# Patient Record
Sex: MALE | Race: WHITE | URBAN - METROPOLITAN AREA
[De-identification: names, ages, dates, MRNs, and addresses within clinical notes are randomized per-mention and may not be internally consistent; named-entity substitution may affect disease eponyms.]

---

## 2017-07-27 ENCOUNTER — OFFICE VISIT (OUTPATIENT)
Dept: FAMILY MEDICINE CLINIC | Age: 47
End: 2017-07-27

## 2017-07-27 VITALS
HEART RATE: 82 BPM | RESPIRATION RATE: 20 BRPM | WEIGHT: 253 LBS | DIASTOLIC BLOOD PRESSURE: 80 MMHG | SYSTOLIC BLOOD PRESSURE: 120 MMHG | OXYGEN SATURATION: 96 % | HEIGHT: 69 IN | BODY MASS INDEX: 37.47 KG/M2 | TEMPERATURE: 98.3 F

## 2017-07-27 DIAGNOSIS — J06.9 UPPER RESPIRATORY TRACT INFECTION, UNSPECIFIED TYPE: Primary | ICD-10-CM

## 2017-07-27 RX ORDER — GUAIFENESIN 600 MG/1
600 TABLET, EXTENDED RELEASE ORAL 2 TIMES DAILY
COMMUNITY
End: 2020-02-03 | Stop reason: ALTCHOICE

## 2017-07-27 RX ORDER — AMOXICILLIN AND CLAVULANATE POTASSIUM 500; 125 MG/1; MG/1
1 TABLET, FILM COATED ORAL 2 TIMES DAILY
Qty: 20 TAB | Refills: 0 | Status: SHIPPED | OUTPATIENT
Start: 2017-07-27 | End: 2017-08-06

## 2017-07-27 RX ORDER — HYDROCODONE POLISTIREX AND CHLORPHENIRAMINE POLISTIREX 10; 8 MG/5ML; MG/5ML
5 SUSPENSION, EXTENDED RELEASE ORAL
Qty: 75 ML | Refills: 0 | Status: SHIPPED | OUTPATIENT
Start: 2017-07-27 | End: 2020-02-03 | Stop reason: ALTCHOICE

## 2017-07-27 NOTE — MR AVS SNAPSHOT
Visit Information Date & Time Provider Department Dept. Phone Encounter #  
 7/27/2017 11:45 AM [facility] [phone] [id number] Upcoming Health Maintenance Date Due DTaP/Tdap/Td series (1 - Tdap) 8/6/1992 INFLUENZA AGE 9 TO ADULT 8/1/2017 Allergies as of 7/27/2017  Review Complete On: 7/27/2017 By: Obed Gates No Known Allergies Current Immunizations  Never Reviewed Name Date Influenza Vaccine 1/15/2013 Not reviewed this visit You Were Diagnosed With   
  
 Codes Comments Upper respiratory tract infection, unspecified type    -  Primary ICD-10-CM: J06.9 ICD-9-CM: 465.9 Vitals BP Pulse Temp Resp Height(growth percentile) Weight(growth percentile) 120/80 (BP 1 Location: Left arm, BP Patient Position: Sitting) 82 98.3 °F (36.8 °C) (Oral) 20 5' 9\" (1.753 m) 253 lb (114.8 kg) SpO2 BMI Smoking Status 96% 37.36 kg/m2 Never Smoker Vitals History BMI and BSA Data Body Mass Index Body Surface Area  
 37.36 kg/m 2 2.36 m 2 Preferred Pharmacy Pharmacy Name Phone [facility] [address] [place] [facility] [phone] Your Updated Medication List  
  
   
This list is accurate as of: 7/27/17 12:19 PM.  Always use your most recent med list.  
  
  
  
  
 amoxicillin-clavulanate 500-125 mg per tablet Commonly known as:  AUGMENTIN Take 1 Tab by mouth two (2) times a day for 10 days. chlorpheniramine-HYDROcodone 10-8 mg/5 mL suspension Commonly known as:  Clarnce Flurry Take 5 mL by mouth nightly as needed for Cough. Max Daily Amount: 5 mL.  
  
 guaiFENesin-dextromethorphan -30 mg per tablet Commonly known as:  HUMIBID DM Take 1 Tab by mouth two (2) times a day. HYDROcodone-homatropine 5-1.5 mg/5 mL (5 mL) syrup Commonly known as:  HYCODAN Take 5 mL by mouth four (4) times daily as needed for Cough.  Max Daily Amount: 20 mL. MUCINEX 600 mg ER tablet Generic drug:  guaiFENesin ER Take 600 mg by mouth two (2) times a day. Prescriptions Printed Refills  
 chlorpheniramine-HYDROcodone (TUSSIONEX) 10-8 mg/5 mL suspension 0 Sig: Take 5 mL by mouth nightly as needed for Cough. Max Daily Amount: 5 mL. Class: Print Route: Oral  
  
Prescriptions Sent to Pharmacy Refills  
 amoxicillin-clavulanate (AUGMENTIN) 500-125 mg per tablet 0 Sig: Take 1 Tab by mouth two (2) times a day for 10 days. Class: Normal  
 Pharmacy: [facility] [address] [place] [facility] #: [phone] Route: Oral  
  
Patient Instructions Upper Respiratory Infection (Cold): Care Instructions Your Care Instructions An upper respiratory infection, or URI, is an infection of the nose, sinuses, or throat. URIs are spread by coughs, sneezes, and direct contact. The common cold is the most frequent kind of URI. The flu and sinus infections are other kinds of URIs. Almost all URIs are caused by viruses. Antibiotics won't cure them. But you can treat most infections with home care. This may include drinking lots of fluids and taking over-the-counter pain medicine. You will probably feel better in 4 to 10 days. The doctor has checked you carefully, but problems can develop later. If you notice any problems or new symptoms, get medical treatment right away. Follow-up care is a key part of your treatment and safety. Be sure to make and go to all appointments, and call your doctor if you are having problems. It's also a good idea to know your test results and keep a list of the medicines you take. How can you care for yourself at home? · To prevent dehydration, drink plenty of fluids, enough so that your urine is light yellow or clear like water. Choose water and other caffeine-free clear liquids until you feel better.  If you have kidney, heart, or liver disease and have to limit fluids, talk with your doctor before you increase the amount of fluids you drink. · Take an over-the-counter pain medicine, such as acetaminophen (Tylenol), ibuprofen (Advil, Motrin), or naproxen (Aleve). Read and follow all instructions on the label. · Before you use cough and cold medicines, check the label. These medicines may not be safe for young children or for people with certain health problems. · Be careful when taking over-the-counter cold or flu medicines and Tylenol at the same time. Many of these medicines have acetaminophen, which is Tylenol. Read the labels to make sure that you are not taking more than the recommended dose. Too much acetaminophen (Tylenol) can be harmful. · Get plenty of rest. 
· Do not smoke or allow others to smoke around you. If you need help quitting, talk to your doctor about stop-smoking programs and medicines. These can increase your chances of quitting for good. When should you call for help? Call 911 anytime you think you may need emergency care. For example, call if: 
· You have severe trouble breathing. Call your doctor now or seek immediate medical care if: 
· You seem to be getting much sicker. · You have new or worse trouble breathing. · You have a new or higher fever. · You have a new rash. Watch closely for changes in your health, and be sure to contact your doctor if: 
· You have a new symptom, such as a sore throat, an earache, or sinus pain. · You cough more deeply or more often, especially if you notice more mucus or a change in the color of your mucus. · You do not get better as expected. Where can you learn more? Go to http://haim-hilda.info/. Enter P460 in the search box to learn more about \"Upper Respiratory Infection (Cold): Care Instructions. \" Current as of: March 25, 2017 Content Version: 11.3 © 1930-9834 [employer].  Care instructions adapted under license by [address] (which disclaims liability or warranty for this information). If you have questions about a medical condition or this instruction, always ask your healthcare professional. [address] any warranty or liability for your use of this information. Introducing [facility]! [facility] introduces JAZIO patient portal. Now you can access parts of your medical record, email your doctor's office, and request medication refills online. 1. In your internet browser, go to [email or web address] 2. Click on the First Time User? Click Here link in the Sign In box. You will see the New Member Sign Up page. 3. Enter your JAZIO Access Code exactly as it appears below. You will not need to use this code after youve completed the sign-up process. If you do not sign up before the expiration date, you must request a new code. · JAZIO Access Code: [email or web address] Expires: 10/25/2017 12:19 PM 
 
4. Enter the last four digits of your Social Security Number (xxxx) and Date of Birth (mm/dd/yyyy) as indicated and click Submit. You will be taken to the next sign-up page. 5. Create a JAZIO ID. This will be your JAZIO login ID and cannot be changed, so think of one that is secure and easy to remember. 6. Create a JAZIO password. You can change your password at any time. 7. Enter your Password Reset Question and Answer. This can be used at a later time if you forget your password. 8. Enter your e-mail address. You will receive e-mail notification when new information is available in [address] 9. Click Sign Up. You can now view and download portions of your medical record. 10. Click the Download Summary menu link to download a portable copy of your medical information. If you have questions, please visit the Frequently Asked Questions section of the JAZIO website.  Remember, JAZIO is NOT to be used for urgent needs. For medical emergencies, dial 911. Now available from your iPhone and Android! Please provide this summary of care documentation to your next provider. Your primary care clinician is listed as Russell Edward. If you have any questions after today's visit, please call [phone].

## 2017-07-27 NOTE — PATIENT INSTRUCTIONS

## 2017-07-27 NOTE — PROGRESS NOTES
Chief Complaint   Patient presents with    Cough     1. Have you been to the ER, urgent care clinic since your last visit? Hospitalized since your last visit? No    2. Have you seen or consulted any other health care providers outside of the [address] [place] [address] since your last visit? Include any pap smears or colon screening.  No

## 2017-07-27 NOTE — PROGRESS NOTES
Assessment/Plan:    *Diagnoses and all orders for this visit:    1. Upper respiratory tract infection, unspecified type    Other orders  -     amoxicillin-clavulanate (AUGMENTIN) 500-125 mg per tablet; Take 1 Tab by mouth two (2) times a day for 10 days. -     chlorpheniramine-HYDROcodone (TUSSIONEX) 10-8 mg/5 mL suspension; Take 5 mL by mouth nightly as needed for Cough. Max Daily Amount: 5 mL. Pt advised to complete the full course of Abx. Will call if not improving. The plan was discussed with the patient. The patient verbalized understanding and is in agreement with the plan. All medication potential side effects were discussed with the patient.    -------------------------------------------------------------------------------------------------------------------        Amada Zabala is a 40 y.o. male and presents with Cough         Subjective:  Pt here for 2 weeks of cough, with yellow phlegm. Started off as a cold and progressed into his chest.  Denies any fevers. Sleep has been an issue, not resting at night due to the cough. ROS:  Constitutional: No recent weight change. No weakness/fatigue. No f/c. Skin: No rashes, change in nails/hair, itching   HENT: No HA, dizziness. No hearing loss/tinnitus. No nasal congestion/discharge. Eyes: No change in vision, double/blurred vision or eye pain/redness. Cardiovascular: No CP/palpitations. No STANTON/orthopnea/PND. Respiratory: No cough/sputum, dyspnea, wheezing. Gastointestinal: No dysphagia, reflux. No n/v. No constipation/diarrhea. No melena/rectal bleeding. Genitourinary: No dysuria, urinary hesitancy, nocturia, hematuria. No incontinence. Musculoskeletal: No joint pain/stiffness. No muscle pain/tenderness. Endo: No heat/cold intolerance, no polyuria/polydypsia. Heme: No h/o anemia. No easy bleeding/bruising. Allergy/Immunology: No seasonal rhinitis. Denies frequent colds, sinus/ear infections.    Neurological: No seizures/numbness/weakness. No paresthesias. Psychiatric:  No depression, anxiety. The problem list was updated as a part of today's visit. Patient Active Problem List   Diagnosis Code    Chest congestion R09.89    Exposure to influenza Z20.828       The PSH,  were reviewed. SH:  Social History   Substance Use Topics    Smoking status: Never Smoker    Smokeless tobacco: Never Used    Alcohol use No       Medications/Allergies:  Current Outpatient Prescriptions on File Prior to Visit   Medication Sig Dispense Refill    HYDROcodone-homatropine (HYCODAN) 5-1.5 mg/5 mL (5 mL) syrup Take 5 mL by mouth four (4) times daily as needed for Cough. Max Daily Amount: 20 mL. 240 mL 0    guaiFENesin-dextromethorphan (HUMIBID DM)  mg per tablet Take 1 Tab by mouth two (2) times a day. No current facility-administered medications on file prior to visit. No Known Allergies      Health Maintenance:   Health Maintenance   Topic Date Due    DTaP/Tdap/Td series (1 - Tdap) 08/06/1992    INFLUENZA AGE 9 TO ADULT  08/01/2017       Objective:  Visit Vitals    /80 (BP 1 Location: Left arm, BP Patient Position: Sitting)    Pulse 82    Temp 98.3 °F (36.8 °C) (Oral)    Resp 20    Ht 5' 9\" (1.753 m)    Wt 253 lb (114.8 kg)    SpO2 96%    BMI 37.36 kg/m2          Nurses notes and VS reviewed.       Physical Examination: General appearance - alert, well appearing, and in no distress  Chest - rhonchi noted scattered  Heart - normal rate, regular rhythm, normal S1, S2, no murmurs, rubs, clicks or gallops      Labwork and Ancillary Studies:    CBC w/Diff  No results found for: WBC, WBCLT, HGB, HGBP, PLT, HGBEXT, PLTEXT      Basic Metabolic Profile  No results found for: NA, K, CL, CO2, AGAP, GLU, BUN, CREA, BUCR, GFRAA, GFRNA, CA, GFRAA      LFT  No results found for: GPT, ALT, SGOT, GGT, GGTP, AP, APIT, APX, CBIL, TBIL, TBILI      Cholesterol  No results found for: CHOL, CHOLX, CHLST, CHOLV, HDL, LDL, LDLC, DLDLP, TGLX, TRIGL, TRIGP, CHHD, CHHDX

## 2020-02-03 ENCOUNTER — OFFICE VISIT (OUTPATIENT)
Dept: FAMILY MEDICINE CLINIC | Age: 50
End: 2020-02-03

## 2020-02-03 VITALS
HEART RATE: 77 BPM | DIASTOLIC BLOOD PRESSURE: 96 MMHG | RESPIRATION RATE: 16 BRPM | HEIGHT: 69 IN | BODY MASS INDEX: 37.18 KG/M2 | SYSTOLIC BLOOD PRESSURE: 156 MMHG | OXYGEN SATURATION: 96 % | WEIGHT: 251 LBS | TEMPERATURE: 98.9 F

## 2020-02-03 DIAGNOSIS — R50.9 FEVER AND CHILLS: ICD-10-CM

## 2020-02-03 DIAGNOSIS — E66.01 SEVERE OBESITY (HCC): ICD-10-CM

## 2020-02-03 DIAGNOSIS — J06.9 UPPER RESPIRATORY TRACT INFECTION, UNSPECIFIED TYPE: Primary | ICD-10-CM

## 2020-02-03 DIAGNOSIS — Z00.00 ANNUAL PHYSICAL EXAM: ICD-10-CM

## 2020-02-03 LAB
FLUAV+FLUBV AG NOSE QL IA.RAPID: NEGATIVE POS/NEG
FLUAV+FLUBV AG NOSE QL IA.RAPID: NEGATIVE POS/NEG
VALID INTERNAL CONTROL?: YES

## 2020-02-03 RX ORDER — DOXYCYCLINE 100 MG/1
100 TABLET ORAL 2 TIMES DAILY
Qty: 20 TAB | Refills: 0 | Status: SHIPPED | OUTPATIENT
Start: 2020-02-03 | End: 2020-02-13

## 2020-02-03 RX ORDER — ALBUTEROL SULFATE 90 UG/1
2 AEROSOL, METERED RESPIRATORY (INHALATION)
Qty: 1 INHALER | Refills: 0 | Status: SHIPPED | OUTPATIENT
Start: 2020-02-03

## 2020-02-03 NOTE — PROGRESS NOTES
Assessment/Plan:    *Diagnoses and all orders for this visit:    1. Upper respiratory tract infection, unspecified type  -     doxycycline (ADOXA) 100 mg tablet; Take 1 Tab by mouth two (2) times a day for 10 days. -     albuterol (PROVENTIL HFA, VENTOLIN HFA, PROAIR HFA) 90 mcg/actuation inhaler; Take 2 Puffs by inhalation every eight (8) hours as needed for Wheezing. 2. Fever and chills  -     AMB POC RJ INFLUENZA A/B TEST    3. Severe obesity ([address]    4. Annual physical exam  -     CBC WITH AUTOMATED DIFF; Future  -     HEPATIC FUNCTION PANEL; Future  -     LIPID PANEL; Future  -     METABOLIC PANEL, BASIC; Future  -     TSH 3RD GENERATION; Future  -     T4, FREE; Future  -     URINALYSIS W/ RFLX MICROSCOPIC; Future  -     VITAMIN D, 25 HYDROXY; Future      Flu test negative. Advised pt to return for physical,  BW prior. The plan was discussed with the patient. The patient verbalized understanding and is in agreement with the plan. All medication potential side effects were discussed with the patient.    -------------------------------------------------------------------------------------------------------------------        Azra Chan is a 51 y.o. male and presents with Generalized Body Aches; Cough; and Chills (started wednesday )          Subjective:  Starting last week, aches, chills,+ fever 101, + dry cough. Has been taking Ibuprofen, Robitussin. His wife tested positive for flu this morning in our office. Review of Systems - ENT ROS: positive for - sore throat  Respiratory ROS: positive for - cough and wheezing  Cardiovascular ROS: no chest pain or dyspnea on exertion  Gastrointestinal ROS: no abdominal pain, change in bowel habits, or black or bloody stools   MS - + body aches      The problem list was updated as a part of today's visit.   Patient Active Problem List   Diagnosis Code    Chest congestion R09.89    Exposure to influenza Z20.828    Severe obesity ([facility] [address] E66.01       The PSH,  were reviewed. SH:  Social History     Tobacco Use    Smoking status: Never Smoker    Smokeless tobacco: Never Used   Substance Use Topics    Alcohol use: No    Drug use: No       Medications/Allergies:  Current Outpatient Medications on File Prior to Visit   Medication Sig Dispense Refill    [DISCONTINUED] guaiFENesin ER (MUCINEX) 600 mg ER tablet Take 600 mg by mouth two (2) times a day.  [DISCONTINUED] chlorpheniramine-HYDROcodone (TUSSIONEX) 10-8 mg/5 mL suspension Take 5 mL by mouth nightly as needed for Cough. Max Daily Amount: 5 mL. 75 mL 0    [DISCONTINUED] HYDROcodone-homatropine (HYCODAN) 5-1.5 mg/5 mL (5 mL) syrup Take 5 mL by mouth four (4) times daily as needed for Cough. Max Daily Amount: 20 mL. 240 mL 0    [DISCONTINUED] guaiFENesin-dextromethorphan (HUMIBID DM)  mg per tablet Take 1 Tab by mouth two (2) times a day. No current facility-administered medications on file prior to visit. No Known Allergies      Health Maintenance:   Health Maintenance   Topic Date Due    DTaP/Tdap/Td series (1 - Tdap) 08/06/1982    Influenza Age 5 to Adult  08/01/2019    Pneumococcal 0-64 years  Aged Out       Objective:  Visit Vitals  BP (!) 156/96   Pulse 77   Temp 98.9 °F (37.2 °C) (Oral)   Resp 16   Ht 5' 9\" (1.753 m)   Wt 251 lb (113.9 kg)   SpO2 96%   BMI 37.07 kg/m²          Nurses notes and VS reviewed.       Physical Examination: General appearance - alert, well appearing, and in no distress  Mouth - erythematous  Neck - supple, no significant adenopathy  Chest - wheezing noted scattereed, rhonchi noted mild  Heart - normal rate, regular rhythm, normal S1, S2, no murmurs, rubs, clicks or gallops          Labwork and Ancillary Studies:    CBC w/Diff  No results found for: WBC, WBCLT, HGB, HGBP, PLT, HGBEXT, PLTEXT, HGBEXT, PLTEXT      Basic Metabolic Profile  No results found for: NA, K, CL, CO2, AGAP, GLU, BUN, CREA, BUCR, GFRAA, GFRNA, CA, GFRAA LFT  No results found for: GPT, ALT, SGOT, GGT, GGTP, AP, APIT, APX, CBIL, TBIL, TBILI      Cholesterol   No results found for: CHOL, CHOLX, CHLST, CHOLV, HDL, HDLP, LDL, LDLC, DLDLP, TGLX, TRIGL, TRIGP, CHHD, CHHDX

## 2020-02-03 NOTE — PROGRESS NOTES
George Alston is a 51 y.o. male (: 1970) presenting to address:    Chief Complaint   Patient presents with    Generalized Body Aches    Cough    Chills     started wednesday        Vitals:    20 1331   BP: (!) 156/96   Pulse: 77   Resp: 16   Temp: 98.9 °F (37.2 °C)   TempSrc: Oral   SpO2: 96%   Weight: 251 lb (113.9 kg)   Height: 5' 9\" (1.753 m)   PainSc:   0 - No pain       Hearing/Vision:   No exam data present    Learning Assessment:     Learning Assessment 3/27/2015   PRIMARY LEARNER Patient   HIGHEST LEVEL OF EDUCATION - PRIMARY LEARNER  2 YEARS OF COLLEGE   BARRIERS PRIMARY LEARNER NONE   CO-LEARNER CAREGIVER No   PRIMARY LANGUAGE ENGLISH    NEED No   LEARNER PREFERENCE PRIMARY LISTENING   ANSWERED BY Patient   RELATIONSHIP SELF     Depression Screening:     3 most recent PHQ Screens 2/3/2020   Little interest or pleasure in doing things Not at all   Feeling down, depressed, irritable, or hopeless Not at all   Total Score PHQ 2 0     Fall Risk Assessment:     Fall Risk Assessment, last 12 mths 2/3/2020   Able to walk? Yes   Fall in past 12 months? No     Abuse Screening:     Abuse Screening Questionnaire 2/3/2020   Do you ever feel afraid of your partner? N   Are you in a relationship with someone who physically or mentally threatens you? N   Is it safe for you to go home? Y     Coordination of Care Questionaire:   1. Have you been to the ER, urgent care clinic since your last visit? Hospitalized since your last visit? NO    2. Have you seen or consulted any other health care providers outside of the [address] [place] [address] since your last visit? Include any pap smears or colon screening. NO    Advanced Directive:   1. Do you have an Advanced Directive? NO    2. Would you like information on Advanced Directives?  NO

## 2020-02-03 NOTE — PATIENT INSTRUCTIONS
